# Patient Record
Sex: MALE | URBAN - METROPOLITAN AREA
[De-identification: names, ages, dates, MRNs, and addresses within clinical notes are randomized per-mention and may not be internally consistent; named-entity substitution may affect disease eponyms.]

---

## 2023-06-13 ENCOUNTER — OFFICE VISIT (OUTPATIENT)
Dept: URBAN - METROPOLITAN AREA CLINIC 32 | Facility: CLINIC | Age: 23
End: 2023-06-13
Payer: COMMERCIAL

## 2023-06-13 DIAGNOSIS — H52.13 MYOPIA, BILATERAL: Primary | ICD-10-CM

## 2023-06-13 DIAGNOSIS — H53.041 AMBLYOPIA SUSPECT, RIGHT EYE: ICD-10-CM

## 2023-06-13 DIAGNOSIS — H52.31 ANISOMETROPIA: ICD-10-CM

## 2023-06-13 PROCEDURE — 92004 COMPRE OPH EXAM NEW PT 1/>: CPT | Performed by: OPTOMETRIST

## 2023-06-13 PROCEDURE — 92310 CONTACT LENS FITTING OU: CPT | Performed by: OPTOMETRIST

## 2023-06-13 ASSESSMENT — KERATOMETRY
OD: 44.00
OS: 43.63

## 2023-06-13 ASSESSMENT — INTRAOCULAR PRESSURE
OS: 14
OD: 15

## 2023-06-13 ASSESSMENT — VISUAL ACUITY
OS: 20/20
OD: 20/25

## 2023-06-13 NOTE — IMPRESSION/PLAN
Impression: Amblyopia suspect, right eye: H53.041. Plan: Discussed diagnosis in detail with patient. Patient reports history of patching. No treatment is required at this time. Monitor.